# Patient Record
Sex: FEMALE | Race: OTHER
[De-identification: names, ages, dates, MRNs, and addresses within clinical notes are randomized per-mention and may not be internally consistent; named-entity substitution may affect disease eponyms.]

---

## 2020-08-31 ENCOUNTER — HOSPITAL ENCOUNTER (EMERGENCY)
Dept: HOSPITAL 8 - ED | Age: 25
Discharge: HOME | End: 2020-08-31
Payer: SELF-PAY

## 2020-08-31 VITALS — WEIGHT: 92.59 LBS | HEIGHT: 62 IN | BODY MASS INDEX: 17.04 KG/M2

## 2020-08-31 VITALS — DIASTOLIC BLOOD PRESSURE: 58 MMHG | SYSTOLIC BLOOD PRESSURE: 114 MMHG
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
## 2020-08-31 DIAGNOSIS — G89.11: ICD-10-CM
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
## 2020-08-31 DIAGNOSIS — S61.511A: Primary | ICD-10-CM

## 2020-08-31 PROCEDURE — 99284 EMERGENCY DEPT VISIT MOD MDM: CPT

## 2020-08-31 PROCEDURE — 12042 INTMD RPR N-HF/GENIT2.6-7.5: CPT

## 2020-08-31 NOTE — NUR
PT RESTING ON GURNEY. NO DISTRESS NOTED. PT DENIES ANY NEEDS AT THIS TIME. 
AWAITING IMAGING READ AT THIS TIME.

## 2020-09-06 ENCOUNTER — APPOINTMENT (OUTPATIENT)
Dept: RADIOLOGY | Facility: MEDICAL CENTER | Age: 25
End: 2020-09-06
Attending: EMERGENCY MEDICINE

## 2020-09-06 ENCOUNTER — HOSPITAL ENCOUNTER (EMERGENCY)
Facility: MEDICAL CENTER | Age: 25
End: 2020-09-06
Attending: EMERGENCY MEDICINE

## 2020-09-06 VITALS
BODY MASS INDEX: 16.31 KG/M2 | OXYGEN SATURATION: 98 % | HEART RATE: 70 BPM | HEIGHT: 62 IN | SYSTOLIC BLOOD PRESSURE: 105 MMHG | RESPIRATION RATE: 16 BRPM | DIASTOLIC BLOOD PRESSURE: 65 MMHG | TEMPERATURE: 98.6 F | WEIGHT: 88.63 LBS

## 2020-09-06 DIAGNOSIS — Z48.02 VISIT FOR SUTURE REMOVAL: ICD-10-CM

## 2020-09-06 DIAGNOSIS — R29.898 HAND WEAKNESS: ICD-10-CM

## 2020-09-06 PROCEDURE — 73110 X-RAY EXAM OF WRIST: CPT | Mod: RT

## 2020-09-06 PROCEDURE — 99283 EMERGENCY DEPT VISIT LOW MDM: CPT

## 2020-09-06 NOTE — DISCHARGE INSTRUCTIONS
Please call the orthopedic clinic listed above, call Tuesday morning to schedule a follow-up appointment for complete recheck, and recheck of your hand.  They should be able to see you within 1 week.  Return to the emergency department if you are not able to follow-up within 1 week, and your strength has not completely returned to normal.  Return immediately if you develop any new or worsening symptoms, this includes worsening pain, redness, drainage from the wound, numbness or tingling in the hand, or if you have any further concerns.

## 2020-09-06 NOTE — ED TRIAGE NOTES
".  Chief Complaint   Patient presents with   • Suture Removal     Right wrist.  Initial procedure at [facility] on Sunday, August 30.    Pt requests suture removal  Sutures right wrist well approximated. + minor erythema noted.  Pt states \" the whole area is tender. \"  Pt reports tingling right # 5 finger.  No fever/chills.  Pt not on any antibiotics.   "

## 2020-09-06 NOTE — ED PROVIDER NOTES
"ED Provider Note    Chief Complaint:   Suture removal, right wrist pain    HPI:  Tanya Fuller is a 25 y.o. female who presents for suture removal. She reports that the sutures were placed seven days ago at [facility].  She sustained a laceration to the right wrist overlying the ulnar aspect of the wrist.  She reports decreased strength in the fifth digit.  She did not follow-up with primary care nor specialist, she threw away the discharge paperwork before she had a chance to look at it and was uncertain as to what her follow-up instructions were.  She states that she is still having some trouble moving the wrist due to pain, has not followed up with her primary care physician since she was seen in the emergency department.  She reports that she was post have an x-ray, but did not wait until it was done.  She reports no other concerns, no other injuries.  She has no significant past medical history, no history of impaired immunity, no history of diabetes.    Review of Systems:  See HPI for pertinent positives and negatives.    Past Medical History:       Social History:  Social History     Tobacco Use   • Smoking status: Never Smoker   • Smokeless tobacco: Never Used   Substance and Sexual Activity   • Alcohol use: Yes   • Drug use: Never   • Sexual activity: Not on file       Surgical History:  patient denies any surgical history    Current Medications:  Home Medications     Reviewed by Dylan Woodrfuf R.N. (Registered Nurse) on 09/06/20 at 1115  Med List Status: Complete   Medication Last Dose Status        Patient Connor Taking any Medications                       Allergies:  No Known Allergies    Physical Exam:  Vital Signs: /65   Pulse 70   Temp 37 °C (98.6 °F)   Resp 16   Ht 1.575 m (5' 2\")   Wt 40.2 kg (88 lb 10 oz)   LMP 09/03/2020 (Exact Date)   SpO2 98%   BMI 16.21 kg/m²   Constitutional: Alert, no acute distress  HENT: Atraumatic  Neck: Normal range of " motion  Cardiovascular: Right upper extremity warm, well perfused, 2+ radial pulse  Pulmonary: Normal work of breathing  Skin: Well-healing laceration overlying the right wrist along the ulnar aspect, sutures in place with no drainage, no surrounding redness, no evidence of cellulitis or wound infection.  Musculoskeletal: Sensory and motor function of the hands are intact in the radial, median, and ulnar nerve distributions.  She does have some decreased  strength in the affected hand, that seems as though it is due to pain rather than true weakness.    Medical records reviewed for continuity of care.  Medical records from [place] are not available for review at this time.    Radiology:  DX-WRIST-COMPLETE 3+ RIGHT   Final Result      No acute findings.         MDM:  Patient presents for suture removal, wound recheck, as well as concern for some residual pain and weakness in the right wrist.  Am unable to review the prior medical records from [place] regarding the initial treatment and management of the wound.  Wound is healing well with no evidence of infection.  As noted in physical exam, she does have some residual decreased  strength, uncertain if this is due to pain or weakness.  No obvious neurologic deficit identified.  Plan at this time is for discharge home, sutures were removed according to below procedure note.  She is referred to [facility], on-call today, for hand recheck and further evaluation.  She will call at the opening of business hours to schedule a follow-up appointment. Return precautions were discussed with the patient, and provided in written form with the patient's discharge instructions.     Suture removal:  Performed by: Corina Penn MD  Body area: Right wrist  Wound appearance: Clean  Number of sutures removed: 7  Post removal: Wound is clean and dry  Patient tolerated the procedure well with no complications.    Personal protective equipment including N95  surgical respirator, goggles, and gloves were used during this encounter.       Disposition:  Discharged home in stable condition    Final Impression:  1. Visit for suture removal    2. Hand weakness        Electronically signed by: Corina Penn MD, 9/6/2020 3:54 PM

## 2020-09-06 NOTE — ED NOTES
Agree with triage note. Sutures noted to incision on R wrist. Pt denies fevers.     A/o x4, speaking in full sentences.